# Patient Record
Sex: FEMALE | Race: WHITE | NOT HISPANIC OR LATINO | Employment: OTHER | URBAN - METROPOLITAN AREA
[De-identification: names, ages, dates, MRNs, and addresses within clinical notes are randomized per-mention and may not be internally consistent; named-entity substitution may affect disease eponyms.]

---

## 2023-11-06 PROBLEM — I10 BENIGN ESSENTIAL HTN: Status: ACTIVE | Noted: 2023-11-06

## 2023-11-06 PROBLEM — N94.10 DYSPAREUNIA IN FEMALE: Status: ACTIVE | Noted: 2023-11-06

## 2023-11-06 PROBLEM — N95.2 POSTMENOPAUSAL ATROPHIC VAGINITIS: Status: ACTIVE | Noted: 2023-11-06

## 2023-11-07 ENCOUNTER — OFFICE VISIT (OUTPATIENT)
Dept: PRIMARY CARE | Facility: CLINIC | Age: 78
End: 2023-11-07
Payer: MEDICARE

## 2023-11-07 VITALS
DIASTOLIC BLOOD PRESSURE: 86 MMHG | HEART RATE: 83 BPM | BODY MASS INDEX: 35.95 KG/M2 | TEMPERATURE: 96.8 F | OXYGEN SATURATION: 96 % | WEIGHT: 181 LBS | SYSTOLIC BLOOD PRESSURE: 138 MMHG

## 2023-11-07 DIAGNOSIS — B37.9 YEAST INFECTION: ICD-10-CM

## 2023-11-07 DIAGNOSIS — R06.00 DYSPNEA, UNSPECIFIED TYPE: ICD-10-CM

## 2023-11-07 DIAGNOSIS — I10 HYPERTENSION, UNSPECIFIED TYPE: Primary | ICD-10-CM

## 2023-11-07 DIAGNOSIS — Z79.899 LONG TERM CURRENT USE OF DIURETIC: ICD-10-CM

## 2023-11-07 DIAGNOSIS — R49.0 HOARSENESS: ICD-10-CM

## 2023-11-07 DIAGNOSIS — R53.83 EASY FATIGABILITY: ICD-10-CM

## 2023-11-07 DIAGNOSIS — E78.5 HYPERLIPIDEMIA, UNSPECIFIED HYPERLIPIDEMIA TYPE: ICD-10-CM

## 2023-11-07 PROCEDURE — 99214 OFFICE O/P EST MOD 30 MIN: CPT | Performed by: STUDENT IN AN ORGANIZED HEALTH CARE EDUCATION/TRAINING PROGRAM

## 2023-11-07 PROCEDURE — 3075F SYST BP GE 130 - 139MM HG: CPT | Performed by: STUDENT IN AN ORGANIZED HEALTH CARE EDUCATION/TRAINING PROGRAM

## 2023-11-07 PROCEDURE — 3079F DIAST BP 80-89 MM HG: CPT | Performed by: STUDENT IN AN ORGANIZED HEALTH CARE EDUCATION/TRAINING PROGRAM

## 2023-11-07 PROCEDURE — 1036F TOBACCO NON-USER: CPT | Performed by: STUDENT IN AN ORGANIZED HEALTH CARE EDUCATION/TRAINING PROGRAM

## 2023-11-07 PROCEDURE — 93000 ELECTROCARDIOGRAM COMPLETE: CPT | Performed by: STUDENT IN AN ORGANIZED HEALTH CARE EDUCATION/TRAINING PROGRAM

## 2023-11-07 RX ORDER — OMEPRAZOLE 20 MG/1
20 CAPSULE, DELAYED RELEASE ORAL
COMMUNITY
Start: 2022-01-12

## 2023-11-07 RX ORDER — NYSTATIN 100000 [USP'U]/G
1 POWDER TOPICAL 2 TIMES DAILY
Qty: 30 G | Refills: 1 | Status: SHIPPED | OUTPATIENT
Start: 2023-11-07 | End: 2024-11-06

## 2023-11-07 RX ORDER — OLMESARTAN MEDOXOMIL AND HYDROCHLOROTHIAZIDE 20/12.5 20; 12.5 MG/1; MG/1
1 TABLET ORAL DAILY
COMMUNITY
Start: 2020-04-08 | End: 2023-12-05 | Stop reason: ALTCHOICE

## 2023-11-07 NOTE — PROGRESS NOTES
Subjective   Patient ID: Miladis Nolan is a 78 y.o. female who presents for Hypertension.  HPI  She is 78 years old female who presented today to the office complaining of shortness of breath with exertion, little things at home like taking shower, are doing senior fitness exercise.  Shortness of breath has been going on for the past 6 months  She also is feeling tired, gaining weight around 10 pounds for the last 3 months.  She also has hoarseness especially in the morning. No recent infection for the past 3-6 months  Past medical history is positive for GERD, which has been treated for years with omeprazole.  Given that she is taking the omeprazole her symptoms are not controlled.  She had regurgitating and burning sensation in the chest.    Denies new onset headaches, fever, chills, n/v/d, chest pain, abdominal pain, urinary symptoms.     Review of Systems  All other systems have been reviewed and are negative.    Visit Vitals  /86   Pulse 83   Temp 36 °C (96.8 °F)   Wt 82.1 kg (181 lb)   SpO2 96%   BMI 35.95 kg/m²   Smoking Status Never   BSA 1.86 m²       Objective   Physical Exam  General: Alert and oriented. Appears well-nourished and in no acute distress.  Eyes: PERRLA. EOMI.  Head/neck: Normocephalic. Supple.  Lymphatics: No cervical lymphadenopathy.  Respiratory/Thorax: Clear to auscultation bilaterally. No wheezing.   Cardiovascular: Regular rate and rhythm. No murmurs.  Gastrointestinal: Soft, nontender, nondistended. +BS   Musculoskeletal: ROM intact. No joint swelling. Normal strength   Extremities: Warm and well perfused.  Positive for peripheral edema up to the mid-shin  Neurological: No gross neurologic deficits.   Psychological: Appropriate mood and affect.   Skin: erythematous papules under breast area, bilateral     Assessment/Plan   She is 78 years old female came today to the office complaining of tiredness and shortness of breath with exertion.    #Shortness of breath could be due to  heart failure vs anemia  -EKG at the office was unremarkable  -Labs such as CBC, CMP, TSH, BNP  -Cardiac ultrasound  -Chest x-ray    # long term of diuretic use  -magnesium level    #Need for lipid screening  -Abnormal lipid panel last time  -Lipid panel    #GERD  -Symptoms are not controlled with omeprazole  -Referral to GI    # high BP value   -normal BP at dentist   -do a log of BP and bring it back to the next visit    # yeast infection  -nystatin power    No red flags. Follow up in 1 week    Problem List Items Addressed This Visit    None  Visit Diagnoses       Hypertension, unspecified type    -  Primary    Relevant Orders    CBC    Comprehensive metabolic panel    Long term current use of diuretic        Relevant Orders    Magnesium    Dyspnea, unspecified type        Relevant Orders    B-type natriuretic peptide    XR chest 2 views    Transthoracic Echo (TTE) Complete    ECG 12 lead (Clinic Performed)    Easy fatigability        Relevant Orders    TSH with reflex to Free T4 if abnormal    Hyperlipidemia, unspecified hyperlipidemia type        Relevant Orders    Lipid Panel    Hoarseness        Relevant Orders    Referral to Gastroenterology            I have personally reviewed all available pertinent labs, imaging, and consult notes with the patient.     All questions and concerns were addressed. Patient verbalizes understanding instructions and agrees with established plan of care.     I discussed the plan with Dr.Zen Olga Elaine MD  Family medicine resident  PGY2

## 2023-11-08 ENCOUNTER — HOSPITAL ENCOUNTER (OUTPATIENT)
Dept: RADIOLOGY | Facility: HOSPITAL | Age: 78
Discharge: HOME | End: 2023-11-08
Payer: MEDICARE

## 2023-11-08 ENCOUNTER — LAB (OUTPATIENT)
Dept: LAB | Facility: LAB | Age: 78
End: 2023-11-08
Payer: MEDICARE

## 2023-11-08 DIAGNOSIS — R06.00 DYSPNEA, UNSPECIFIED TYPE: ICD-10-CM

## 2023-11-08 DIAGNOSIS — Z79.899 LONG TERM CURRENT USE OF DIURETIC: ICD-10-CM

## 2023-11-08 DIAGNOSIS — I10 HYPERTENSION, UNSPECIFIED TYPE: ICD-10-CM

## 2023-11-08 DIAGNOSIS — E78.5 HYPERLIPIDEMIA, UNSPECIFIED HYPERLIPIDEMIA TYPE: ICD-10-CM

## 2023-11-08 DIAGNOSIS — R53.83 EASY FATIGABILITY: ICD-10-CM

## 2023-11-08 LAB
ALBUMIN SERPL BCP-MCNC: 4.2 G/DL (ref 3.4–5)
ALP SERPL-CCNC: 76 U/L (ref 33–136)
ALT SERPL W P-5'-P-CCNC: 17 U/L (ref 7–45)
ANION GAP SERPL CALC-SCNC: 15 MMOL/L (ref 10–20)
AST SERPL W P-5'-P-CCNC: 19 U/L (ref 9–39)
BILIRUB SERPL-MCNC: 0.5 MG/DL (ref 0–1.2)
BNP SERPL-MCNC: 27 PG/ML (ref 0–99)
BUN SERPL-MCNC: 25 MG/DL (ref 6–23)
CALCIUM SERPL-MCNC: 9.5 MG/DL (ref 8.6–10.6)
CHLORIDE SERPL-SCNC: 101 MMOL/L (ref 98–107)
CHOLEST SERPL-MCNC: 310 MG/DL (ref 0–199)
CHOLESTEROL/HDL RATIO: 4.4
CO2 SERPL-SCNC: 27 MMOL/L (ref 21–32)
CREAT SERPL-MCNC: 0.7 MG/DL (ref 0.5–1.05)
ERYTHROCYTE [DISTWIDTH] IN BLOOD BY AUTOMATED COUNT: 12.7 % (ref 11.5–14.5)
GFR SERPL CREATININE-BSD FRML MDRD: 88 ML/MIN/1.73M*2
GLUCOSE SERPL-MCNC: 97 MG/DL (ref 74–99)
HCT VFR BLD AUTO: 41.4 % (ref 36–46)
HDLC SERPL-MCNC: 71 MG/DL
HGB BLD-MCNC: 13.5 G/DL (ref 12–16)
LDLC SERPL CALC-MCNC: 215 MG/DL
MAGNESIUM SERPL-MCNC: 2.18 MG/DL (ref 1.6–2.4)
MCH RBC QN AUTO: 31.6 PG (ref 26–34)
MCHC RBC AUTO-ENTMCNC: 32.6 G/DL (ref 32–36)
MCV RBC AUTO: 97 FL (ref 80–100)
NON HDL CHOLESTEROL: 239 MG/DL (ref 0–149)
NRBC BLD-RTO: 0 /100 WBCS (ref 0–0)
PLATELET # BLD AUTO: 395 X10*3/UL (ref 150–450)
POTASSIUM SERPL-SCNC: 4.3 MMOL/L (ref 3.5–5.3)
PROT SERPL-MCNC: 7 G/DL (ref 6.4–8.2)
RBC # BLD AUTO: 4.27 X10*6/UL (ref 4–5.2)
SODIUM SERPL-SCNC: 139 MMOL/L (ref 136–145)
TRIGL SERPL-MCNC: 120 MG/DL (ref 0–149)
TSH SERPL-ACNC: 0.89 MIU/L (ref 0.44–3.98)
VLDL: 24 MG/DL (ref 0–40)
WBC # BLD AUTO: 6.3 X10*3/UL (ref 4.4–11.3)

## 2023-11-08 PROCEDURE — 84443 ASSAY THYROID STIM HORMONE: CPT

## 2023-11-08 PROCEDURE — 80053 COMPREHEN METABOLIC PANEL: CPT

## 2023-11-08 PROCEDURE — 36415 COLL VENOUS BLD VENIPUNCTURE: CPT

## 2023-11-08 PROCEDURE — 71046 X-RAY EXAM CHEST 2 VIEWS: CPT | Mod: FY

## 2023-11-08 PROCEDURE — 85027 COMPLETE CBC AUTOMATED: CPT

## 2023-11-08 PROCEDURE — 80061 LIPID PANEL: CPT

## 2023-11-08 PROCEDURE — 71046 X-RAY EXAM CHEST 2 VIEWS: CPT | Performed by: RADIOLOGY

## 2023-11-08 PROCEDURE — 83880 ASSAY OF NATRIURETIC PEPTIDE: CPT

## 2023-11-08 PROCEDURE — 83735 ASSAY OF MAGNESIUM: CPT

## 2023-11-09 ENCOUNTER — DOCUMENTATION (OUTPATIENT)
Dept: PRIMARY CARE | Facility: CLINIC | Age: 78
End: 2023-11-09
Payer: MEDICARE

## 2023-11-11 NOTE — RESULT ENCOUNTER NOTE
I called the patient in 2 different days but I was not able to talk, I left a voice massage to call the office regarding her lab results.   Olga

## 2023-11-13 ENCOUNTER — TELEPHONE (OUTPATIENT)
Dept: PRIMARY CARE | Facility: CLINIC | Age: 78
End: 2023-11-13
Payer: MEDICARE

## 2023-11-14 DIAGNOSIS — E78.5 DYSLIPIDEMIA: Primary | ICD-10-CM

## 2023-11-14 RX ORDER — GEMFIBROZIL 600 MG/1
600 TABLET, FILM COATED ORAL 2 TIMES DAILY
Qty: 60 TABLET | Refills: 0 | Status: SHIPPED | OUTPATIENT
Start: 2023-11-14 | End: 2023-12-19 | Stop reason: SDUPTHER

## 2023-11-14 NOTE — TELEPHONE ENCOUNTER
I spoke with the patient about her lipid panel.  I also saw her previous record and the medication she had abused in the past for her dyslipidemia.  She have myalgia, weakness and cramps with Lipitor, simvastatin, zetia  Based on that the plan is made to give her gemfibrozil side 600 mg twice daily for 1 month.  If the patient can tolerate this medication we can continue with this for a long time.  Patient agreed to that plan and the prescription is made.    Olga Elaine  Family medicine resident  PGY2

## 2023-11-27 ENCOUNTER — TELEPHONE (OUTPATIENT)
Dept: PRIMARY CARE | Facility: CLINIC | Age: 78
End: 2023-11-27

## 2023-11-27 ENCOUNTER — OFFICE VISIT (OUTPATIENT)
Dept: DERMATOLOGY | Facility: CLINIC | Age: 78
End: 2023-11-27
Payer: MEDICARE

## 2023-11-27 ENCOUNTER — HOSPITAL ENCOUNTER (OUTPATIENT)
Dept: CARDIOLOGY | Facility: HOSPITAL | Age: 78
Discharge: HOME | End: 2023-11-27
Payer: MEDICARE

## 2023-11-27 DIAGNOSIS — R06.00 DYSPNEA, UNSPECIFIED TYPE: ICD-10-CM

## 2023-11-27 DIAGNOSIS — L70.0 OPEN COMEDONE: Primary | ICD-10-CM

## 2023-11-27 DIAGNOSIS — L57.0 ACTINIC KERATOSIS: ICD-10-CM

## 2023-11-27 LAB
AORTIC VALVE MEAN GRADIENT: 5
AORTIC VALVE PEAK VELOCITY: 1.72
AV PEAK GRADIENT: 11.8
AVA (PEAK VEL): 1.19
AVA (VTI): 1.31
EJECTION FRACTION APICAL 4 CHAMBER: 50
EJECTION FRACTION: 52
LEFT ATRIUM VOLUME AREA LENGTH INDEX BSA: 20
LEFT VENTRICLE INTERNAL DIMENSION DIASTOLE: 4.13 (ref 3.5–6)
LEFT VENTRICULAR OUTFLOW TRACT DIAMETER: 1.6
MITRAL VALVE E/A RATIO: 0.87
MITRAL VALVE E/E' RATIO: 14.2
RIGHT VENTRICLE FREE WALL PEAK S': 15.6
RIGHT VENTRICLE PEAK SYSTOLIC PRESSURE: 43.2
TRICUSPID ANNULAR PLANE SYSTOLIC EXCURSION: 1.7

## 2023-11-27 PROCEDURE — 11310 SHAVE SKIN LESION 0.5 CM/<: CPT

## 2023-11-27 PROCEDURE — 17000 DESTRUCT PREMALG LESION: CPT

## 2023-11-27 PROCEDURE — 88305 TISSUE EXAM BY PATHOLOGIST: CPT | Performed by: DERMATOLOGY

## 2023-11-27 PROCEDURE — 10040 EXTRACTION: CPT

## 2023-11-27 PROCEDURE — 99204 OFFICE O/P NEW MOD 45 MIN: CPT

## 2023-11-27 PROCEDURE — 93306 TTE W/DOPPLER COMPLETE: CPT

## 2023-11-27 PROCEDURE — 93306 TTE W/DOPPLER COMPLETE: CPT | Performed by: INTERNAL MEDICINE

## 2023-11-27 PROCEDURE — 1036F TOBACCO NON-USER: CPT

## 2023-11-27 PROCEDURE — 88305 TISSUE EXAM BY PATHOLOGIST: CPT | Mod: TC,DER

## 2023-11-27 NOTE — TELEPHONE ENCOUNTER
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

## 2023-11-27 NOTE — PROGRESS NOTES
Subjective   HPI: Miladis Nolan is a 78 y.o. female is a new patient here for evaluation and treatment of an enlarging lesion on the left parotid area that was treated by an another provider with cryotherapy.  The lesion initially started off as a blackhead.  Patient and then has grown in size now has a scale on top of it.  Patient cannot get the lesion to go away.  Patient also asks for removal of a blackhead on the right lateral cheek.  Patient also would like her face checked for actinic keratoses that she has previously been treated with cryotherapy.    ROS: No other skin or systemic complaints other than what is documented elsewhere in the note.    ALLERGIES: Tetracycline    SOCIAL:  reports that she has never smoked. She has never used smokeless tobacco. No history on file for alcohol use and drug use.    Objective   Right Zygomatic Area  Single 1 mm black papule with central punctum    Left Parotid Area  3 mm black papule with no central punctum and an overlying scale    Left Buccal Cheek  -Thin erythematous papules with gritty scale          Assessment/Plan   1. Open comedone  Right Zygomatic Area; Left Parotid Area    Discussed benign nature with patient.  Patient opts for removal today.    On the left parotid area patient and I opted for shave removal.  The open comedone appears to have an actinic keratoses on top of it.  Patient has had this area previously treated with cryotherapy at a different location.  Discussed wound care with patient.  Patient verbalizes understanding and opts for removal today.    Skin biopsy - Left Parotid Area    Specimen 1 - Dermatopathology- DERM LAB  Differential Diagnosis: Open comedone versus AK versus ISK  Check Margins Yes/No?:  No  Comments:    Dermpath Lab: Routine Histopathology (formalin-fixed tissue)    2. Actinic keratosis  Left Buccal Cheek    Discussed with patient that this is a precancerous area.  Discussed cryotherapy with patient.  Patient opts cryotherapy  today.  1 AK was treated today.    Cryotherapy, skin lesion - Left Buccal Cheek         FOLLOW UP: As needed    The patient was encouraged to contact me with any further questions or concerns.  Catrachita Silveira PA-C  11/27/2023

## 2023-11-29 LAB
LABORATORY COMMENT REPORT: NORMAL
PATH REPORT.FINAL DX SPEC: NORMAL
PATH REPORT.GROSS SPEC: NORMAL
PATH REPORT.MICROSCOPIC SPEC OTHER STN: NORMAL
PATH REPORT.RELEVANT HX SPEC: NORMAL
PATH REPORT.TOTAL CANCER: NORMAL

## 2023-11-30 ENCOUNTER — TELEPHONE (OUTPATIENT)
Dept: DERMATOLOGY | Facility: CLINIC | Age: 78
End: 2023-11-30
Payer: MEDICARE

## 2023-12-02 DIAGNOSIS — I10 HYPERTENSION, UNSPECIFIED TYPE: Primary | ICD-10-CM

## 2023-12-04 RX ORDER — IRBESARTAN AND HYDROCHLOROTHIAZIDE 150; 12.5 MG/1; MG/1
1 TABLET, FILM COATED ORAL DAILY
Qty: 90 TABLET | Refills: 2 | Status: SHIPPED | OUTPATIENT
Start: 2023-12-04

## 2023-12-05 ENCOUNTER — OFFICE VISIT (OUTPATIENT)
Dept: PRIMARY CARE | Facility: CLINIC | Age: 78
End: 2023-12-05
Payer: MEDICARE

## 2023-12-05 VITALS
DIASTOLIC BLOOD PRESSURE: 85 MMHG | WEIGHT: 175 LBS | TEMPERATURE: 98.1 F | BODY MASS INDEX: 34.75 KG/M2 | OXYGEN SATURATION: 98 % | HEART RATE: 78 BPM | SYSTOLIC BLOOD PRESSURE: 139 MMHG

## 2023-12-05 DIAGNOSIS — R06.00 DYSPNEA, UNSPECIFIED TYPE: ICD-10-CM

## 2023-12-05 DIAGNOSIS — J32.9 SINUSITIS, UNSPECIFIED CHRONICITY, UNSPECIFIED LOCATION: Primary | ICD-10-CM

## 2023-12-05 DIAGNOSIS — E78.5 HYPERLIPIDEMIA, UNSPECIFIED HYPERLIPIDEMIA TYPE: ICD-10-CM

## 2023-12-05 DIAGNOSIS — R93.1 ABNORMAL ECHOCARDIOGRAM: ICD-10-CM

## 2023-12-05 PROCEDURE — 1036F TOBACCO NON-USER: CPT | Performed by: STUDENT IN AN ORGANIZED HEALTH CARE EDUCATION/TRAINING PROGRAM

## 2023-12-05 PROCEDURE — 1159F MED LIST DOCD IN RCRD: CPT | Performed by: STUDENT IN AN ORGANIZED HEALTH CARE EDUCATION/TRAINING PROGRAM

## 2023-12-05 PROCEDURE — 99214 OFFICE O/P EST MOD 30 MIN: CPT | Performed by: STUDENT IN AN ORGANIZED HEALTH CARE EDUCATION/TRAINING PROGRAM

## 2023-12-05 PROCEDURE — 3079F DIAST BP 80-89 MM HG: CPT | Performed by: STUDENT IN AN ORGANIZED HEALTH CARE EDUCATION/TRAINING PROGRAM

## 2023-12-05 PROCEDURE — 3075F SYST BP GE 130 - 139MM HG: CPT | Performed by: STUDENT IN AN ORGANIZED HEALTH CARE EDUCATION/TRAINING PROGRAM

## 2023-12-05 RX ORDER — AMOXICILLIN AND CLAVULANATE POTASSIUM 875; 125 MG/1; MG/1
875 TABLET, FILM COATED ORAL 2 TIMES DAILY
Qty: 14 TABLET | Refills: 0 | Status: SHIPPED | OUTPATIENT
Start: 2023-12-05 | End: 2023-12-12

## 2023-12-05 ASSESSMENT — ENCOUNTER SYMPTOMS
SINUS PRESSURE: 1
PHOTOPHOBIA: 0
ABDOMINAL PAIN: 0
PALPITATIONS: 0
CHILLS: 1
CHEST TIGHTNESS: 0
APPETITE CHANGE: 0
EYE REDNESS: 0
SEIZURES: 0
HEADACHES: 1
COUGH: 0
SINUS PAIN: 1
ABDOMINAL DISTENTION: 0
WEAKNESS: 0
FEVER: 0
SHORTNESS OF BREATH: 1
NUMBNESS: 0
VOICE CHANGE: 0
DIZZINESS: 0

## 2023-12-05 ASSESSMENT — PATIENT HEALTH QUESTIONNAIRE - PHQ9
2. FEELING DOWN, DEPRESSED OR HOPELESS: NOT AT ALL
1. LITTLE INTEREST OR PLEASURE IN DOING THINGS: NOT AT ALL
SUM OF ALL RESPONSES TO PHQ9 QUESTIONS 1 AND 2: 0

## 2023-12-05 NOTE — PROGRESS NOTES
I oversaw the medical student and reviewed his note. I agree with the note stated as above. Patient was staffed with Dr. Elkin Abdul DO, PGY-2  [facility]

## 2023-12-05 NOTE — PROGRESS NOTES
Chief Complaint Miladis Nolan is a 78 y.o. female who presents for follow up of SOB and blood pressure. Pt also has new complaint of Sinus pain.      HPI: 78 y.o. female presented with pain and pressure over the left side of here face. The pain started a week ago with a unilateral left sided headache followed by left side ear pain, tender neck of the left back side and a burning sensation of the left nostril. She also mention having a green phlegm in the morning. She used tylenol for the headache which seems to help. The patient also mentioned that she had a precancerous face growth that was removed from the left side of the face last week before having left side sinus pain.     At this time the patient also mentioned that she is tolerating the lipid medication gemfibrozil very well.     ROS:  Review of Systems   Constitutional:  Positive for chills. Negative for appetite change and fever.   HENT:  Positive for ear pain, sinus pressure and sinus pain. Negative for hearing loss and voice change.    Eyes:  Negative for photophobia, redness and visual disturbance.   Respiratory:  Positive for shortness of breath. Negative for cough and chest tightness.    Cardiovascular:  Negative for chest pain, palpitations and leg swelling.   Gastrointestinal:  Negative for abdominal distention and abdominal pain.   Neurological:  Positive for headaches. Negative for dizziness, seizures, weakness and numbness.         Meds:    Current Outpatient Medications:     amoxicillin-pot clavulanate (Augmentin) 875-125 mg tablet, Take 1 tablet (875 mg) by mouth 2 times a day for 7 days., Disp: 14 tablet, Rfl: 0    gemfibrozil (Lopid) 600 mg tablet, Take 1 tablet (600 mg) by mouth 2 times a day., Disp: 60 tablet, Rfl: 0    irbesartan-hydrochlorothiazide (Avalide) 150-12.5 mg tablet, TAKE 1 TABLET BY MOUTH EVERY DAY, Disp: 90 tablet, Rfl: 2    nystatin (Mycostatin) 100,000 unit/gram powder, Apply 1 Application topically 2 times a day., Disp:  30 g, Rfl: 1    omeprazole (PriLOSEC) 20 mg DR capsule, Take 1 capsule (20 mg) by mouth once daily., Disp: , Rfl:     Allergies:  Allergies   Allergen Reactions    Tetracycline Unknown and Rash       PE:  Visit Vitals  /85   Pulse 78   Temp 36.7 °C (98.1 °F)   Wt 79.4 kg (175 lb)   SpO2 98%   BMI 34.75 kg/m²   Smoking Status Never   BSA 1.83 m²     Physical Exam  Constitutional:       Appearance: Normal appearance.   HENT:      Head: Normocephalic and atraumatic.      Right Ear: Tympanic membrane normal.      Left Ear: Tympanic membrane normal.      Nose: Nose normal.      Mouth/Throat:      Mouth: Mucous membranes are moist.   Eyes:      Extraocular Movements: Extraocular movements intact.      Conjunctiva/sclera: Conjunctivae normal.      Pupils: Pupils are equal, round, and reactive to light.   Cardiovascular:      Rate and Rhythm: Normal rate and regular rhythm.   Pulmonary:      Effort: Pulmonary effort is normal.      Breath sounds: Normal breath sounds.   Abdominal:      General: Abdomen is flat.      Palpations: Abdomen is soft.   Musculoskeletal:      Cervical back: Normal range of motion. Tenderness present.   Skin:     General: Skin is warm.      Findings: No rash.   Neurological:      General: No focal deficit present.      Mental Status: She is alert and oriented to person, place, and time.   Psychiatric:         Mood and Affect: Mood normal.         Behavior: Behavior normal.           Assessment/Plan  Diagnoses and all orders for this visit:  Sinusitis, unspecified chronicity, unspecified location (Primary)  -     amoxicillin-pot clavulanate (Augmentin) 875-125 mg tablet; Take 1 tablet (875 mg) by mouth 2 times a day for 7 days.  Dyspnea, unspecified type  -     Pulmonary function testing (Ancillary Performed); Future         Follow up in 2-3 months for lipid panel. Will call if the sinusitis persist.       Patient was staffed with Dr. Elkin VILLA  [facility]

## 2023-12-17 NOTE — PROGRESS NOTES
Subjective   HPI: Miladis Nolan is a 78 y.o. female is here for evaluation and treatment of an AK found by bx [id number] on 11/27/23. Patient also mentions a rough patch on the brige of her nose that has popped up within the last month. Doesn't bleed or hurt.     ROS: No other skin or systemic complaints other than what is documented elsewhere in the note.    ALLERGIES: Tetracycline    SOCIAL:  reports that she has never smoked. She has never used smokeless tobacco. No history on file for alcohol use and drug use.    Objective   Dorsum of Nose, Left Buccal Cheek  -Thin erythematous papules with gritty scale    [id number]          Assessment/Plan   1. Actinic keratosis (2)  Dorsum of Nose; Left Buccal Cheek    Discussed precancerous nature of AK's with patient. I recommended cryotherapy for treatment today. I discussed possible side effects of cryotherapy with patient, including erythema, swelling, or blistering. Patient verbalizes understanding and opts for treatment today.    A total of 2 AK's were tx with cryotherapy today.    Discussed sunscreen use while she is in [place]. I recommend SPF 60+.    Cryotherapy, skin lesion - Dorsum of Nose         FOLLOW UP: please schedule fbse    The patient was encouraged to contact me with any further questions or concerns.  Catrachita Silveira PA-C  12/28/2023

## 2023-12-18 ENCOUNTER — APPOINTMENT (OUTPATIENT)
Dept: RESPIRATORY THERAPY | Facility: HOSPITAL | Age: 78
End: 2023-12-18
Payer: MEDICARE

## 2023-12-18 ENCOUNTER — HOSPITAL ENCOUNTER (OUTPATIENT)
Dept: RESPIRATORY THERAPY | Facility: HOSPITAL | Age: 78
Discharge: HOME | End: 2023-12-18
Payer: MEDICARE

## 2023-12-18 DIAGNOSIS — R06.00 DYSPNEA, UNSPECIFIED TYPE: ICD-10-CM

## 2023-12-18 PROCEDURE — 94729 DIFFUSING CAPACITY: CPT

## 2023-12-18 PROCEDURE — 94726 PLETHYSMOGRAPHY LUNG VOLUMES: CPT | Performed by: INTERNAL MEDICINE

## 2023-12-18 PROCEDURE — 94726 PLETHYSMOGRAPHY LUNG VOLUMES: CPT

## 2023-12-18 PROCEDURE — 94729 DIFFUSING CAPACITY: CPT | Performed by: INTERNAL MEDICINE

## 2023-12-18 PROCEDURE — 98960 EDU&TRN PT SELF-MGMT NQHP 1: CPT

## 2023-12-18 PROCEDURE — 94060 EVALUATION OF WHEEZING: CPT

## 2023-12-18 PROCEDURE — 94060 EVALUATION OF WHEEZING: CPT | Performed by: INTERNAL MEDICINE

## 2023-12-18 PROCEDURE — 94664 DEMO&/EVAL PT USE INHALER: CPT

## 2023-12-18 PROCEDURE — 94760 N-INVAS EAR/PLS OXIMETRY 1: CPT

## 2023-12-19 DIAGNOSIS — E78.5 DYSLIPIDEMIA: ICD-10-CM

## 2023-12-19 RX ORDER — GEMFIBROZIL 600 MG/1
600 TABLET, FILM COATED ORAL 2 TIMES DAILY
Qty: 60 TABLET | Refills: 0 | Status: SHIPPED | OUTPATIENT
Start: 2023-12-19 | End: 2024-01-04 | Stop reason: SDUPTHER

## 2023-12-19 NOTE — TELEPHONE ENCOUNTER
[facility]  Gemfibrozil 600 MG  Twice daily  Patient states that she has an appointment on 01/04/24.  She stated that she knows she needs to get blood work done but she got sick and put it off and was hoping that she could just go after the holiday to go.  So if Dr Granger would please call in enough to hold her until her appointment.

## 2023-12-21 ENCOUNTER — TELEPHONE (OUTPATIENT)
Dept: EMERGENCY MEDICINE | Facility: HOSPITAL | Age: 78
End: 2023-12-21
Payer: MEDICARE

## 2023-12-28 ENCOUNTER — OFFICE VISIT (OUTPATIENT)
Dept: DERMATOLOGY | Facility: CLINIC | Age: 78
End: 2023-12-28
Payer: MEDICARE

## 2023-12-28 DIAGNOSIS — L57.0 ACTINIC KERATOSIS: ICD-10-CM

## 2023-12-28 LAB
MGC ASCENT PFT - FEV1 - POST: 1.67
MGC ASCENT PFT - FEV1 - PRE: 1.56
MGC ASCENT PFT - FEV1 - PREDICTED: 1.72
MGC ASCENT PFT - FVC - POST: 2.08
MGC ASCENT PFT - FVC - PRE: 2.15
MGC ASCENT PFT - FVC - PREDICTED: 2.24

## 2023-12-28 PROCEDURE — 17000 DESTRUCT PREMALG LESION: CPT

## 2023-12-28 PROCEDURE — 99213 OFFICE O/P EST LOW 20 MIN: CPT

## 2023-12-28 PROCEDURE — 1159F MED LIST DOCD IN RCRD: CPT

## 2023-12-28 PROCEDURE — 1036F TOBACCO NON-USER: CPT

## 2023-12-28 PROCEDURE — 17003 DESTRUCT PREMALG LES 2-14: CPT

## 2023-12-29 ENCOUNTER — LAB (OUTPATIENT)
Dept: LAB | Facility: LAB | Age: 78
End: 2023-12-29
Payer: MEDICARE

## 2023-12-29 DIAGNOSIS — E78.5 HYPERLIPIDEMIA, UNSPECIFIED HYPERLIPIDEMIA TYPE: ICD-10-CM

## 2023-12-29 LAB
ALBUMIN SERPL BCP-MCNC: 4 G/DL (ref 3.4–5)
ALP SERPL-CCNC: 78 U/L (ref 33–136)
ALT SERPL W P-5'-P-CCNC: 19 U/L (ref 7–45)
ANION GAP SERPL CALC-SCNC: 12 MMOL/L (ref 10–20)
AST SERPL W P-5'-P-CCNC: 18 U/L (ref 9–39)
BILIRUB SERPL-MCNC: 0.4 MG/DL (ref 0–1.2)
BUN SERPL-MCNC: 23 MG/DL (ref 6–23)
CALCIUM SERPL-MCNC: 9.4 MG/DL (ref 8.6–10.6)
CHLORIDE SERPL-SCNC: 102 MMOL/L (ref 98–107)
CO2 SERPL-SCNC: 32 MMOL/L (ref 21–32)
CREAT SERPL-MCNC: 0.74 MG/DL (ref 0.5–1.05)
GFR SERPL CREATININE-BSD FRML MDRD: 82 ML/MIN/1.73M*2
GLUCOSE SERPL-MCNC: 86 MG/DL (ref 74–99)
POTASSIUM SERPL-SCNC: 3.6 MMOL/L (ref 3.5–5.3)
PROT SERPL-MCNC: 6.4 G/DL (ref 6.4–8.2)
SODIUM SERPL-SCNC: 142 MMOL/L (ref 136–145)

## 2023-12-29 PROCEDURE — 80053 COMPREHEN METABOLIC PANEL: CPT

## 2023-12-29 PROCEDURE — 36415 COLL VENOUS BLD VENIPUNCTURE: CPT

## 2024-01-04 ENCOUNTER — OFFICE VISIT (OUTPATIENT)
Dept: PRIMARY CARE | Facility: CLINIC | Age: 79
End: 2024-01-04
Payer: MEDICARE

## 2024-01-04 ENCOUNTER — ANCILLARY PROCEDURE (OUTPATIENT)
Dept: RADIOLOGY | Facility: CLINIC | Age: 79
End: 2024-01-04
Payer: MEDICARE

## 2024-01-04 VITALS
WEIGHT: 180 LBS | BODY MASS INDEX: 33.99 KG/M2 | HEIGHT: 61 IN | OXYGEN SATURATION: 96 % | SYSTOLIC BLOOD PRESSURE: 128 MMHG | DIASTOLIC BLOOD PRESSURE: 80 MMHG | HEART RATE: 76 BPM | TEMPERATURE: 98.7 F

## 2024-01-04 DIAGNOSIS — R06.00 DYSPNEA, UNSPECIFIED TYPE: ICD-10-CM

## 2024-01-04 DIAGNOSIS — I10 ESSENTIAL (PRIMARY) HYPERTENSION: ICD-10-CM

## 2024-01-04 DIAGNOSIS — I10 HYPERTENSION, UNSPECIFIED TYPE: Primary | ICD-10-CM

## 2024-01-04 DIAGNOSIS — Z23 IMMUNIZATION DUE: ICD-10-CM

## 2024-01-04 DIAGNOSIS — E78.5 HYPERLIPIDEMIA, UNSPECIFIED HYPERLIPIDEMIA TYPE: ICD-10-CM

## 2024-01-04 DIAGNOSIS — E78.5 DYSLIPIDEMIA: ICD-10-CM

## 2024-01-04 DIAGNOSIS — R06.02 SHORTNESS OF BREATH: ICD-10-CM

## 2024-01-04 DIAGNOSIS — E78.5 HYPERLIPIDEMIA, UNSPECIFIED: ICD-10-CM

## 2024-01-04 PROCEDURE — 3074F SYST BP LT 130 MM HG: CPT | Performed by: STUDENT IN AN ORGANIZED HEALTH CARE EDUCATION/TRAINING PROGRAM

## 2024-01-04 PROCEDURE — 1036F TOBACCO NON-USER: CPT | Performed by: STUDENT IN AN ORGANIZED HEALTH CARE EDUCATION/TRAINING PROGRAM

## 2024-01-04 PROCEDURE — 90715 TDAP VACCINE 7 YRS/> IM: CPT | Performed by: STUDENT IN AN ORGANIZED HEALTH CARE EDUCATION/TRAINING PROGRAM

## 2024-01-04 PROCEDURE — 1159F MED LIST DOCD IN RCRD: CPT | Performed by: STUDENT IN AN ORGANIZED HEALTH CARE EDUCATION/TRAINING PROGRAM

## 2024-01-04 PROCEDURE — 75571 CT HRT W/O DYE W/CA TEST: CPT

## 2024-01-04 PROCEDURE — 90471 IMMUNIZATION ADMIN: CPT | Performed by: STUDENT IN AN ORGANIZED HEALTH CARE EDUCATION/TRAINING PROGRAM

## 2024-01-04 PROCEDURE — 99213 OFFICE O/P EST LOW 20 MIN: CPT | Performed by: STUDENT IN AN ORGANIZED HEALTH CARE EDUCATION/TRAINING PROGRAM

## 2024-01-04 PROCEDURE — 3079F DIAST BP 80-89 MM HG: CPT | Performed by: STUDENT IN AN ORGANIZED HEALTH CARE EDUCATION/TRAINING PROGRAM

## 2024-01-04 RX ORDER — ALBUTEROL SULFATE 90 UG/1
2 AEROSOL, METERED RESPIRATORY (INHALATION) EVERY 6 HOURS PRN
Qty: 18 G | Refills: 2 | Status: SHIPPED | OUTPATIENT
Start: 2024-01-04 | End: 2025-01-03

## 2024-01-04 RX ORDER — GEMFIBROZIL 600 MG/1
600 TABLET, FILM COATED ORAL 2 TIMES DAILY
Qty: 360 TABLET | Refills: 0 | Status: SHIPPED | OUTPATIENT
Start: 2024-01-04 | End: 2024-07-02

## 2024-01-04 RX ORDER — FUROSEMIDE 20 MG/1
20 TABLET ORAL DAILY
COMMUNITY
Start: 2023-12-21

## 2024-01-04 ASSESSMENT — PATIENT HEALTH QUESTIONNAIRE - PHQ9
1. LITTLE INTEREST OR PLEASURE IN DOING THINGS: NOT AT ALL
SUM OF ALL RESPONSES TO PHQ9 QUESTIONS 1 AND 2: 0
2. FEELING DOWN, DEPRESSED OR HOPELESS: NOT AT ALL

## 2024-01-04 NOTE — PROGRESS NOTES
"Subjective   Patient ID: Miladis Nolan is a 78 y.o. female who presents for Follow-up.    HPI   Miladis is following up from last visit she has since seen Dr. Coffman and had a CT cardiac scoring. She was started on Lasik, she experienced some muscle cramping.   She has stopped taking it and will start taking it PRN  She is due for TDAP  CMP was WNL after starting gemfibrozil  Patient declined DXA and mammogram at this time      Review of Systems   Musculoskeletal:         Muscle cramping   All other systems reviewed and are negative.      Objective   /80   Pulse 76   Temp 37.1 °C (98.7 °F)   Ht 1.549 m (5' 1\")   Wt 81.6 kg (180 lb)   SpO2 96%   BMI 34.01 kg/m²     Physical Exam  Constitutional:       Appearance: Normal appearance.   HENT:      Head: Normocephalic and atraumatic.      Right Ear: External ear normal.      Left Ear: External ear normal.      Nose: Nose normal. No congestion or rhinorrhea.   Eyes:      General: No scleral icterus.     Extraocular Movements: Extraocular movements intact.      Conjunctiva/sclera: Conjunctivae normal.   Cardiovascular:      Rate and Rhythm: Normal rate and regular rhythm.      Pulses: Normal pulses.      Heart sounds: Normal heart sounds.      Comments: Systolic murmur  Pulmonary:      Effort: Pulmonary effort is normal.      Breath sounds: Normal breath sounds.   Musculoskeletal:         General: Normal range of motion.      Cervical back: Normal range of motion and neck supple.      Right lower leg: Edema present.      Left lower leg: Edema present.   Skin:     General: Skin is warm and dry.      Capillary Refill: Capillary refill takes less than 2 seconds.   Neurological:      General: No focal deficit present.      Mental Status: She is alert and oriented to person, place, and time. Mental status is at baseline.   Psychiatric:         Mood and Affect: Mood normal.         Behavior: Behavior normal.         Thought Content: Thought content normal.         " Judgment: Judgment normal.         Assessment/Plan   Diagnoses and all orders for this visit:  Hypertension, unspecified type  -     Magnesium; Future  Hyperlipidemia, unspecified hyperlipidemia type  Dyslipidemia  -     gemfibrozil (Lopid) 600 mg tablet; Take 1 tablet (600 mg) by mouth 2 times a day.  Immunization due  -     Tdap vaccine, age 7 years and older  (BOOSTRIX)    Refilled her gemfibrozil for 6 months as she's leaving for [place]  Due to muscle cramping magnesium was started, concerns of lasik diuresis  Encouraged her to take the lasik every other day to prevent muscle cramping and will refill magnesium should it be necessary    I have personally reviewed all available pertinent labs, imaging, and consult notes with the patient.     All questions and concerns were addressed. Patient verbalizes understanding instructions and agrees with established plan of care.     Patient seen and discussed with Dr. Elkin Sánchez MD

## 2024-01-08 ENCOUNTER — OFFICE VISIT (OUTPATIENT)
Dept: GASTROENTEROLOGY | Facility: CLINIC | Age: 79
End: 2024-01-08
Payer: MEDICARE

## 2024-01-08 VITALS — HEART RATE: 88 BPM | WEIGHT: 180 LBS | BODY MASS INDEX: 36.29 KG/M2 | HEIGHT: 59 IN

## 2024-01-08 DIAGNOSIS — K21.00 GASTROESOPHAGEAL REFLUX DISEASE WITH ESOPHAGITIS WITHOUT HEMORRHAGE: Primary | ICD-10-CM

## 2024-01-08 DIAGNOSIS — R49.0 HOARSENESS: ICD-10-CM

## 2024-01-08 PROCEDURE — 1159F MED LIST DOCD IN RCRD: CPT | Performed by: INTERNAL MEDICINE

## 2024-01-08 PROCEDURE — 99204 OFFICE O/P NEW MOD 45 MIN: CPT | Performed by: INTERNAL MEDICINE

## 2024-01-08 PROCEDURE — 1036F TOBACCO NON-USER: CPT | Performed by: INTERNAL MEDICINE

## 2024-01-08 RX ORDER — ESOMEPRAZOLE MAGNESIUM 40 MG/1
40 CAPSULE, DELAYED RELEASE ORAL
Qty: 30 CAPSULE | Refills: 11 | Status: SHIPPED | OUTPATIENT
Start: 2024-01-08 | End: 2024-01-10 | Stop reason: SDUPTHER

## 2024-01-08 RX ORDER — OSPEMIFENE 60 MG/1
1 TABLET, FILM COATED ORAL EVERY 24 HOURS
COMMUNITY
Start: 2018-12-12

## 2024-01-08 RX ORDER — OLMESARTAN MEDOXOMIL AND HYDROCHLOROTHIAZIDE 20/12.5 20; 12.5 MG/1; MG/1
1 TABLET ORAL
COMMUNITY
Start: 2020-04-08

## 2024-01-08 RX ORDER — LOTEPREDNOL ETABONATE 3.8 MG/G
1 GEL OPHTHALMIC 2 TIMES DAILY
COMMUNITY
Start: 2023-10-23

## 2024-01-08 RX ORDER — TRIAMCINOLONE ACETONIDE 1 MG/G
1 CREAM TOPICAL 2 TIMES DAILY
COMMUNITY
Start: 2023-09-07

## 2024-01-08 RX ORDER — ESOMEPRAZOLE MAGNESIUM 40 MG/1
40 CAPSULE, DELAYED RELEASE ORAL
Qty: 30 CAPSULE | Refills: 11 | Status: SHIPPED | OUTPATIENT
Start: 2024-01-08 | End: 2025-01-07

## 2024-01-08 NOTE — PROGRESS NOTES
"Subjective     History of Present Illness:   Miladis Nolan is a 78 y.o. female with PMHx of acid reflux  who presents to GI clinic for follow up.  Former patient of Narayan Patel MD.  Has been on Prilosec for >20 years.   Has heartburn daily.   Regurgitates acid into her throat and mouth.  Notices hoarseness, \"constant cough\" -  good appetite - has \"gained 8 pounds trying to lose weight\" - no dysphagia, no odynophage - no melena    Drinks 2 C coffee daily - nonsmoker - no EtOH - no chocolate (\"can't tolerate\") - no late meals or snacks    EGD x 2 - \"sore spot in stomach\" - had HH - no Cordova's     Tried wedge to Rx reflux - \"didn't make a difference\" - does not awaken with heartburn     Last colonoscopy 2017 - no polyps - \"don't need another one\"   Past Medical History  As per HPI.     Social History  she  reports that she has quit smoking. Her smoking use included cigarettes. She has been exposed to tobacco smoke. She has never used smokeless tobacco.     Family History  her family history is not on file.     Review of Systems  Review of Systems    Allergies  Allergies   Allergen Reactions    Tetracycline Unknown and Rash       Medications  Current Outpatient Medications   Medication Instructions    albuterol 90 mcg/actuation inhaler 2 puffs, inhalation, Every 6 hours PRN    cyanocobalamin (VITAMIN B-12) 50 mcg, oral, Daily    furosemide (LASIX) 20 mg, oral, Daily    gemfibrozil (LOPID) 600 mg, oral, 2 times daily    irbesartan-hydrochlorothiazide (Avalide) 150-12.5 mg tablet 1 tablet, oral, Daily    Lotemax SM 0.38 % drops,gel 1 drop, Both Eyes, 2 times daily    multivitamin with minerals iron-free (Centrum Silver) 1 tablet, oral, Daily    nystatin (Mycostatin) 100,000 unit/gram powder 1 Application, Topical, 2 times daily    olmesartan-hydrochlorothiazide (BENIcar HCT) 20-12.5 mg tablet 1 tablet, oral, Daily RT    omeprazole (PRILOSEC) 20 mg, oral, Daily RT    ospemifene (Osphena) 60 mg tablet 1 tablet, oral, " Every 24 hours    triamcinolone (Kenalog) 0.1 % cream 1 Application, Topical, 2 times daily        Objective   Visit Vitals  Pulse 88      Physical Exam    134/82  Lungs clear  CV - no mrg  Abd - soft, nontender     Allergies    Lab Results   Component Value Date    WBC 6.3 11/08/2023    WBC 7.8 08/22/2019    WBC 5.9 08/08/2019    HGB 13.5 11/08/2023    HGB 11.1 (L) 08/22/2019    HGB 13.5 08/08/2019    HCT 41.4 11/08/2023    HCT 32.3 (L) 08/22/2019    HCT 40.8 08/08/2019     11/08/2023     08/22/2019     08/08/2019     Lab Results   Component Value Date     12/29/2023     11/08/2023     08/08/2019    K 3.6 12/29/2023    K 4.3 11/08/2023    K 3.9 08/08/2019     12/29/2023     11/08/2023    CL 99 08/08/2019    CO2 32 12/29/2023    CO2 27 11/08/2023    CO2 26 08/08/2019    BUN 23 12/29/2023    BUN 25 (H) 11/08/2023    BUN 17 08/08/2019    CREATININE 0.74 12/29/2023    CREATININE 0.70 11/08/2023    CREATININE 0.7 08/08/2019    CALCIUM 9.4 12/29/2023    CALCIUM 9.5 11/08/2023    CALCIUM 9.3 08/08/2019    PROT 6.4 12/29/2023    PROT 7.0 11/08/2023    PROT 7.0 05/31/2018    BILITOT 0.4 12/29/2023    BILITOT 0.5 11/08/2023    BILITOT 0.2 05/31/2018    ALKPHOS 78 12/29/2023    ALKPHOS 76 11/08/2023    ALKPHOS 67 05/31/2018    ALT 19 12/29/2023    ALT 17 11/08/2023    ALT 13 05/31/2018    AST 18 12/29/2023    AST 19 11/08/2023    AST 15 05/31/2018    GLUCOSE 86 12/29/2023    GLUCOSE 97 11/08/2023    GLUCOSE 79 08/08/2019     CT cardiac scoring wo IV contrast  Addendum: Interpreted By:  Ruben Santillan,    ADDENDUM:   Technical: The following is to serve as an over-read for an   unenhanced cardiac CT, to evaluate the extra vascular structures.        Contiguous unenhanced CT sections are performed from level of the   emilee to the upper abdomen.                  Findings: There is a 1-2 mm density in the right upper lobe (image   9). The visualized portions of both lungs  are clear.        There is no sign of pathologic lymph node enlargement. There is no   pericardial or pleural effusion.        Images through the upper abdomen are unremarkable.        The visualized osseous and soft tissue structures of the chest wall   are intact.                  Impression: Punctate nonspecific density in the right upper lobe. The   extra vascular structures otherwise have an unremarkable CT   appearance.        Signed by: Ruben Santillan 1/4/2024 10:40 PM        -------- ORIGINAL REPORT --------   Dictation workstation:   [id number]  Narrative: Interpreted By:  Ruben Oneil,   STUDY:  CT CARDIAC SCORING WO IV CONTRAST;  1/4/2024 8:10 am      INDICATION:  Signs/Symptoms:hyperlipidemia.      COMPARISON:  None.      ACCESSION NUMBER(S):  [id number]      ORDERING CLINICIAN:  TAD MACHADO      TECHNIQUE:  Using prospective ECG gating, CT scan of the coronary arteries was  performed without intravenous contrast. Coronary calcium scoring  was  performed according to the method of Agatston.      CT Dose-Length Product (DLP): 93.7 mGy*cm  CT Dose Reduction Employed: Yes, prospective gating, iterative  reconstruction.      FINDINGS:  The score and distribution of calcium in the coronary arteries is as  follows:      LM 1    LCx 89        Total 347      The visualized ascending thoracic aorta measures 3.6 cm in diameter.  The heart is normal in size. No pericardial effusion is present.          The main pulmonary artery, right and left pulmonary artery are normal  in size.          Impression: 1. Coronary artery calcium score of 347*.  2. PINZON 73rd percentile** for age, gender, and race in asymptomatic  patients.          *Coronary Artery  Agatston score      Score risk  Very low 1-99  Mildly increased 100-299  Moderately increased >300  Moderate to severely increased >800      Iza et al. JCCT 2016 (http://dx.doi.org/10.1016/j.jcct.2016.11.003)      ** PINZON Percentile  In  general, greater than 75th percentile for age, gender, and race is  considered to be a higher relative risk and higher lifetime risk  condition. Greater than 75th percentile=moderate to severely  increased relative risk irrespective of the score. Advise using CISNEROS  10 year CHD risk calculator below for better discrimination of risk.      CISNEROS 10-Year CHD Risk with Coronary Artery Calcification can be  calcuate using link below  [email or web address]  Briseyda murguia al. JACC 2015 (http://dx.doi.org/10.1016/j.j  acc.2015.08.035)      Reading Cardiologist: Dr. Ruben Oneil, Date:  1/4/2024  6:12 pm      Signed by: Ruben Oneil 1/4/2024 6:13 PM  Dictation workstation:   [id number]         Miladis Nolan is a 78 y.o. female who presents for follow up of reflux. Has been on omeprazole > 20 years.   Is following antireflux measures - will switch to esomprazole 40 mg daily, call me in 1 month (from [place]).   Discussed possible need for EGD to r/o Cordova's         Baldomero Abdi MD

## 2024-01-10 DIAGNOSIS — K21.00 GASTROESOPHAGEAL REFLUX DISEASE WITH ESOPHAGITIS WITHOUT HEMORRHAGE: ICD-10-CM

## 2024-01-10 DIAGNOSIS — R49.0 HOARSENESS: ICD-10-CM

## 2024-01-10 RX ORDER — ESOMEPRAZOLE MAGNESIUM 40 MG/1
40 CAPSULE, DELAYED RELEASE ORAL
Qty: 30 CAPSULE | Refills: 11 | Status: SHIPPED | OUTPATIENT
Start: 2024-01-10 | End: 2025-01-09

## 2024-07-11 DIAGNOSIS — E78.5 DYSLIPIDEMIA: ICD-10-CM

## 2024-07-11 RX ORDER — GEMFIBROZIL 600 MG/1
600 TABLET, FILM COATED ORAL 2 TIMES DAILY
Qty: 180 TABLET | Refills: 0 | Status: SHIPPED | OUTPATIENT
Start: 2024-07-11 | End: 2025-01-07

## 2024-08-22 DIAGNOSIS — I10 HYPERTENSION, UNSPECIFIED TYPE: ICD-10-CM

## 2024-08-22 RX ORDER — IRBESARTAN AND HYDROCHLOROTHIAZIDE 150; 12.5 MG/1; MG/1
1 TABLET, FILM COATED ORAL DAILY
Qty: 30 TABLET | Refills: 1 | Status: SHIPPED | OUTPATIENT
Start: 2024-08-22

## 2024-10-18 DIAGNOSIS — E78.5 DYSLIPIDEMIA: ICD-10-CM

## 2024-10-21 RX ORDER — GEMFIBROZIL 600 MG/1
600 TABLET, FILM COATED ORAL 2 TIMES DAILY
Qty: 180 TABLET | Refills: 0 | OUTPATIENT
Start: 2024-10-21 | End: 2025-04-19

## 2024-10-26 DIAGNOSIS — I10 HYPERTENSION, UNSPECIFIED TYPE: ICD-10-CM

## 2024-10-29 RX ORDER — IRBESARTAN AND HYDROCHLOROTHIAZIDE 150; 12.5 MG/1; MG/1
1 TABLET, FILM COATED ORAL DAILY
Qty: 90 TABLET | Refills: 2 | Status: SHIPPED | OUTPATIENT
Start: 2024-10-29

## 2025-06-19 ENCOUNTER — APPOINTMENT (OUTPATIENT)
Dept: PRIMARY CARE | Facility: CLINIC | Age: 80
End: 2025-06-19
Payer: MEDICARE

## 2025-06-19 VITALS
WEIGHT: 178 LBS | BODY MASS INDEX: 35.88 KG/M2 | HEART RATE: 71 BPM | DIASTOLIC BLOOD PRESSURE: 82 MMHG | HEIGHT: 59 IN | SYSTOLIC BLOOD PRESSURE: 138 MMHG | OXYGEN SATURATION: 97 %

## 2025-06-19 DIAGNOSIS — Z13.220 NEED FOR LIPID SCREENING: ICD-10-CM

## 2025-06-19 DIAGNOSIS — Z00.00 ROUTINE GENERAL MEDICAL EXAMINATION AT HEALTH CARE FACILITY: Primary | ICD-10-CM

## 2025-06-19 DIAGNOSIS — E78.2 MODERATE MIXED HYPERLIPIDEMIA NOT REQUIRING STATIN THERAPY: ICD-10-CM

## 2025-06-19 DIAGNOSIS — R73.09 ABNORMAL GLUCOSE: ICD-10-CM

## 2025-06-19 DIAGNOSIS — I10 HYPERTENSION, UNSPECIFIED TYPE: ICD-10-CM

## 2025-06-19 DIAGNOSIS — R53.83 FATIGUE, UNSPECIFIED TYPE: ICD-10-CM

## 2025-06-19 DIAGNOSIS — E66.01 OBESITY, MORBID (MULTI): ICD-10-CM

## 2025-06-19 PROBLEM — K21.9 GASTROESOPHAGEAL REFLUX DISEASE WITHOUT ESOPHAGITIS: Status: ACTIVE | Noted: 2022-01-12

## 2025-06-19 PROBLEM — E78.5 HYPERLIPIDEMIA: Status: ACTIVE | Noted: 2023-11-08

## 2025-06-19 PROBLEM — L57.0 ACTINIC KERATOSIS: Status: ACTIVE | Noted: 2025-06-19

## 2025-06-19 PROBLEM — B49 INFECTION DUE TO FUNGUS: Status: RESOLVED | Noted: 2025-06-19 | Resolved: 2025-06-19

## 2025-06-19 RX ORDER — IRBESARTAN AND HYDROCHLOROTHIAZIDE 150; 12.5 MG/1; MG/1
1 TABLET, FILM COATED ORAL DAILY
Qty: 90 TABLET | Refills: 2 | Status: SHIPPED | OUTPATIENT
Start: 2025-06-19

## 2025-06-19 RX ORDER — ROSUVASTATIN CALCIUM 5 MG/1
2.5 TABLET, COATED ORAL DAILY
Qty: 30 TABLET | Refills: 5 | Status: SHIPPED | OUTPATIENT
Start: 2025-06-19 | End: 2026-06-19

## 2025-06-19 ASSESSMENT — ACTIVITIES OF DAILY LIVING (ADL)
BATHING: INDEPENDENT
TAKING_MEDICATION: INDEPENDENT
MANAGING_FINANCES: INDEPENDENT
GROCERY_SHOPPING: INDEPENDENT
DOING_HOUSEWORK: INDEPENDENT

## 2025-06-19 NOTE — PROGRESS NOTES
"Subjective   Reason for Visit: Miladis Nolan is an 80 y.o. female here for a Medicare Wellness visit.        Reviewed all medications by prescribing practitioner or clinical pharmacist (such as prescriptions, OTCs, herbal therapies and supplements) and documented in the medical record.    HPI    No concerns today.  Doing well.    Patient reports that she has been unable to be adherent to gemfibrozil.  She previously has been on simvastatin, atorvastatin, ezetimibe.  She notes that statin she would have myalgias.  Last LDL 11/8/2023 was 215.  We discussed extensively that goal LDL is less than 70.  We agreed to trial rosuvastatin at 2.5 mg for 1 month then can go up to 5 mg thereafter if no myalgias.  Also instructed patient to take it easy for the first 2 weeks on medication.    Patient Care Team:  Marshall Ortiz DO as PCP - General     Review of Systems    Objective   Vitals:  /82   Pulse 71   Ht 1.499 m (4' 11\")   Wt 80.7 kg (178 lb)   SpO2 97%   BMI 35.95 kg/m²       Physical Exam  Constitutional:       General: She is not in acute distress.     Appearance: Normal appearance. She is not ill-appearing, toxic-appearing or diaphoretic.   Cardiovascular:      Rate and Rhythm: Normal rate and regular rhythm.      Pulses: Normal pulses.      Heart sounds: Normal heart sounds. No murmur heard.     No friction rub. No gallop.   Pulmonary:      Effort: Pulmonary effort is normal. No respiratory distress.      Breath sounds: Normal breath sounds. No stridor. No wheezing or rales.   Musculoskeletal:      Right lower leg: No edema.      Left lower leg: No edema.   Skin:     Capillary Refill: Capillary refill takes less than 2 seconds.   Neurological:      Mental Status: She is alert.       Assessment & Plan  Moderate mixed hyperlipidemia not requiring statin therapy  Patient reports that she has been unable to be adherent to gemfibrozil.  She previously has been on simvastatin, atorvastatin, ezetimibe.  She notes " that statin she would have myalgias.  Last LDL 11/8/2023 was 215.  We discussed extensively that goal LDL is less than 70.  We agreed to trial rosuvastatin at 2.5 mg for 1 month then can go up to 5 mg thereafter if no myalgias.  Also instructed patient to take it easy for the first 2 weeks on medication.  Orders:    rosuvastatin (Crestor) 5 mg tablet; Take 0.5 tablets (2.5 mg) by mouth once daily.    Hypertension, unspecified type  Blood pressure under control today 138/82.  She is has been adherent to her irbesartan-hydrochlorothiazide combination pill.  No adverse effects.  Medication refilled.  Orders:    irbesartan-hydrochlorothiazide (Avalide) 150-12.5 mg tablet; Take 1 tablet by mouth once daily.    Need for lipid screening  See above hyperlipidemia  Orders:    Lipid panel; Future    Abnormal glucose    Orders:    Hemoglobin A1C - Lab collect; Future    Routine general medical examination at health care facility    Orders:    1 Year Follow Up In Primary Care - Wellness Exam; Future    Comprehensive Metabolic Panel; Future    Fatigue, unspecified type    Orders:    CBC; Future       Discussed and recommended pneumonia, zoster, RSV vaccination.  Patient will continue think about it.  Also advised patient to work on advanced directive and to fax us that information when she has available.    Follow-up in 6 months for hyperlipidemia and possible up titration of rosuvastatin if tolerated before she goes to [place].    Khari Bradley MD  Primary Care Sports Medicine Fellow  [facility]  [facility]

## 2025-06-19 NOTE — PATIENT INSTRUCTIONS
Pneumococcal vaccination  Zoster (shingles) vaccination  RSV vaccination    Living will/advance directive

## 2025-06-19 NOTE — ASSESSMENT & PLAN NOTE
Patient reports that she has been unable to be adherent to gemfibrozil.  She previously has been on simvastatin, atorvastatin, ezetimibe.  She notes that statin she would have myalgias.  Last LDL 11/8/2023 was 215.  We discussed extensively that goal LDL is less than 70.  We agreed to trial rosuvastatin at 2.5 mg for 1 month then can go up to 5 mg thereafter if no myalgias.  Also instructed patient to take it easy for the first 2 weeks on medication.  Orders:    rosuvastatin (Crestor) 5 mg tablet; Take 0.5 tablets (2.5 mg) by mouth once daily.

## 2025-06-26 LAB
ALBUMIN SERPL-MCNC: 4.1 G/DL (ref 3.6–5.1)
ALP SERPL-CCNC: 67 U/L (ref 37–153)
ALT SERPL-CCNC: 18 U/L (ref 6–29)
ANION GAP SERPL CALCULATED.4IONS-SCNC: 9 MMOL/L (CALC) (ref 7–17)
AST SERPL-CCNC: 17 U/L (ref 10–35)
BILIRUB SERPL-MCNC: 0.4 MG/DL (ref 0.2–1.2)
BUN SERPL-MCNC: 17 MG/DL (ref 7–25)
CALCIUM SERPL-MCNC: 9 MG/DL (ref 8.6–10.4)
CHLORIDE SERPL-SCNC: 102 MMOL/L (ref 98–110)
CHOLEST SERPL-MCNC: 238 MG/DL
CHOLEST/HDLC SERPL: 3.3 (CALC)
CO2 SERPL-SCNC: 28 MMOL/L (ref 20–32)
CREAT SERPL-MCNC: 0.78 MG/DL (ref 0.6–0.95)
EGFRCR SERPLBLD CKD-EPI 2021: 76 ML/MIN/1.73M2
ERYTHROCYTE [DISTWIDTH] IN BLOOD BY AUTOMATED COUNT: 12.7 % (ref 11–15)
EST. AVERAGE GLUCOSE BLD GHB EST-MCNC: 111 MG/DL
EST. AVERAGE GLUCOSE BLD GHB EST-SCNC: 6.2 MMOL/L
GLUCOSE SERPL-MCNC: 93 MG/DL (ref 65–99)
HBA1C MFR BLD: 5.5 %
HCT VFR BLD AUTO: 39.8 % (ref 35–45)
HDLC SERPL-MCNC: 72 MG/DL
HGB BLD-MCNC: 13.2 G/DL (ref 11.7–15.5)
LDLC SERPL CALC-MCNC: 141 MG/DL (CALC)
MCH RBC QN AUTO: 31.7 PG (ref 27–33)
MCHC RBC AUTO-ENTMCNC: 33.2 G/DL (ref 32–36)
MCV RBC AUTO: 95.7 FL (ref 80–100)
NONHDLC SERPL-MCNC: 166 MG/DL (CALC)
PLATELET # BLD AUTO: 355 THOUSAND/UL (ref 140–400)
PMV BLD REES-ECKER: 9.3 FL (ref 7.5–12.5)
POTASSIUM SERPL-SCNC: 4 MMOL/L (ref 3.5–5.3)
PROT SERPL-MCNC: 6.4 G/DL (ref 6.1–8.1)
RBC # BLD AUTO: 4.16 MILLION/UL (ref 3.8–5.1)
SODIUM SERPL-SCNC: 139 MMOL/L (ref 135–146)
TRIGL SERPL-MCNC: 127 MG/DL
WBC # BLD AUTO: 5.4 THOUSAND/UL (ref 3.8–10.8)

## 2025-07-14 ENCOUNTER — TELEPHONE (OUTPATIENT)
Facility: CLINIC | Age: 80
End: 2025-07-14
Payer: MEDICARE

## 2025-07-14 DIAGNOSIS — E78.2 MODERATE MIXED HYPERLIPIDEMIA NOT REQUIRING STATIN THERAPY: ICD-10-CM

## 2025-07-14 NOTE — TELEPHONE ENCOUNTER
Patient calling because she needs a refill on rosuvastatin because paty put her on a half tablet but she could not cut it so he asked paty if it is ok to take the whole thing and he said yes so she needs the refill to be     Rosuvastatin 5 mg   [facility]

## 2025-07-15 DIAGNOSIS — E78.5 HYPERLIPIDEMIA, UNSPECIFIED HYPERLIPIDEMIA TYPE: Primary | ICD-10-CM

## 2025-07-15 RX ORDER — ROSUVASTATIN CALCIUM 5 MG/1
5 TABLET, COATED ORAL DAILY
Qty: 100 TABLET | Refills: 3 | Status: SHIPPED | OUTPATIENT
Start: 2025-07-15 | End: 2026-08-19

## 2025-07-15 RX ORDER — ROSUVASTATIN CALCIUM 5 MG/1
5 TABLET, COATED ORAL DAILY
Qty: 30 TABLET | Refills: 5 | Status: SHIPPED | OUTPATIENT
Start: 2025-07-15 | End: 2026-07-15

## 2026-01-05 ENCOUNTER — APPOINTMENT (OUTPATIENT)
Facility: CLINIC | Age: 81
End: 2026-01-05
Payer: MEDICARE